# Patient Record
Sex: MALE | Race: WHITE | NOT HISPANIC OR LATINO | URBAN - NONMETROPOLITAN AREA
[De-identification: names, ages, dates, MRNs, and addresses within clinical notes are randomized per-mention and may not be internally consistent; named-entity substitution may affect disease eponyms.]

---

## 2020-06-05 ENCOUNTER — IMPORTED ENCOUNTER (OUTPATIENT)
Dept: URBAN - NONMETROPOLITAN AREA CLINIC 1 | Facility: CLINIC | Age: 65
End: 2020-06-05

## 2020-06-05 PROBLEM — H00.11: Noted: 2020-06-05

## 2020-06-05 PROCEDURE — 92002 INTRM OPH EXAM NEW PATIENT: CPT

## 2020-06-05 PROCEDURE — 67800 REMOVE EYELID LESION: CPT

## 2020-06-05 NOTE — PATIENT DISCUSSION
Chalazion:-Recommend pt begin warm compresses and lid scrubs BID as well as artificial tears OU QID. Pt instructed on proper technique for lid scrubs and hot compresses. -START Doxycyline 100 mg po bid X14 days then decrease to 100 mg po qd -If no improvement in 1-2 weeks consider sending pt to Dr Rosanna Walker for eval of I&D.

## 2022-04-10 ASSESSMENT — VISUAL ACUITY
OD_CC: 20/20
OS_CC: 20/20

## 2022-04-10 ASSESSMENT — TONOMETRY
OS_IOP_MMHG: 16
OD_IOP_MMHG: 16